# Patient Record
Sex: FEMALE | Employment: FULL TIME | URBAN - METROPOLITAN AREA
[De-identification: names, ages, dates, MRNs, and addresses within clinical notes are randomized per-mention and may not be internally consistent; named-entity substitution may affect disease eponyms.]

---

## 2019-02-11 ENCOUNTER — HOSPITAL ENCOUNTER (EMERGENCY)
Age: 24
Discharge: HOME | End: 2019-02-11
Attending: EMERGENCY MEDICINE
Payer: COMMERCIAL

## 2019-02-11 VITALS
OXYGEN SATURATION: 100 % | HEART RATE: 79 BPM | DIASTOLIC BLOOD PRESSURE: 94 MMHG | WEIGHT: 122.36 LBS | SYSTOLIC BLOOD PRESSURE: 135 MMHG | RESPIRATION RATE: 18 BRPM | TEMPERATURE: 98.5 F

## 2019-02-11 DIAGNOSIS — N30.00 ACUTE CYSTITIS WITHOUT HEMATURIA: Primary | ICD-10-CM

## 2019-02-11 LAB
BACTERIA: ABNORMAL /HPF
BILIRUBIN URINE: NEGATIVE
BLOOD, URINE: ABNORMAL
CLARITY: CLEAR
COLOR: YELLOW
EPITHELIAL CELLS, UA: ABNORMAL /HPF
GLUCOSE URINE: NEGATIVE MG/DL
HCG(URINE) PREGNANCY TEST: NEGATIVE
KETONES, URINE: NEGATIVE MG/DL
LEUKOCYTE ESTERASE, URINE: ABNORMAL
MICROSCOPIC EXAMINATION: YES
NITRITE, URINE: POSITIVE
PH UA: 6
PROTEIN UA: 100 MG/DL
RBC UA: ABNORMAL /HPF (ref 0–2)
SPECIFIC GRAVITY UA: >=1.03
URINE REFLEX TO CULTURE: YES
URINE TYPE: ABNORMAL
UROBILINOGEN, URINE: 0.2 E.U./DL
WBC UA: ABNORMAL /HPF (ref 0–5)

## 2019-02-11 PROCEDURE — 99283 EMERGENCY DEPT VISIT LOW MDM: CPT

## 2019-02-11 PROCEDURE — 87077 CULTURE AEROBIC IDENTIFY: CPT

## 2019-02-11 PROCEDURE — 6370000000 HC RX 637 (ALT 250 FOR IP): Performed by: EMERGENCY MEDICINE

## 2019-02-11 PROCEDURE — 81001 URINALYSIS AUTO W/SCOPE: CPT

## 2019-02-11 PROCEDURE — 84703 CHORIONIC GONADOTROPIN ASSAY: CPT

## 2019-02-11 PROCEDURE — 87086 URINE CULTURE/COLONY COUNT: CPT

## 2019-02-11 PROCEDURE — 87186 SC STD MICRODIL/AGAR DIL: CPT

## 2019-02-11 RX ORDER — PHENAZOPYRIDINE HYDROCHLORIDE 100 MG/1
100 TABLET, FILM COATED ORAL 3 TIMES DAILY PRN
Qty: 9 TABLET | Refills: 0 | Status: SHIPPED | OUTPATIENT
Start: 2019-02-11 | End: 2019-02-14

## 2019-02-11 RX ORDER — IBUPROFEN 200 MG
200 TABLET ORAL EVERY 6 HOURS PRN
COMMUNITY

## 2019-02-11 RX ORDER — NITROFURANTOIN 25; 75 MG/1; MG/1
100 CAPSULE ORAL ONCE
Status: COMPLETED | OUTPATIENT
Start: 2019-02-11 | End: 2019-02-11

## 2019-02-11 RX ORDER — NITROFURANTOIN 25; 75 MG/1; MG/1
100 CAPSULE ORAL 2 TIMES DAILY
Qty: 14 CAPSULE | Refills: 0 | Status: SHIPPED | OUTPATIENT
Start: 2019-02-11 | End: 2019-02-18

## 2019-02-11 RX ADMIN — NITROFURANTOIN MONOHYDRATE/MACROCRYSTALLINE 100 MG: 25; 75 CAPSULE ORAL at 05:09

## 2019-02-11 ASSESSMENT — PAIN SCALES - GENERAL
PAINLEVEL_OUTOF10: 7
PAINLEVEL_OUTOF10: 7

## 2019-02-11 ASSESSMENT — PAIN DESCRIPTION - LOCATION
LOCATION: BACK
LOCATION: BACK

## 2019-02-11 ASSESSMENT — PAIN DESCRIPTION - ONSET: ONSET: ON-GOING

## 2019-02-11 ASSESSMENT — PAIN DESCRIPTION - FREQUENCY: FREQUENCY: INTERMITTENT

## 2019-02-13 LAB
ORGANISM: ABNORMAL
URINE CULTURE, ROUTINE: ABNORMAL
URINE CULTURE, ROUTINE: ABNORMAL

## 2023-04-03 ENCOUNTER — HOSPITAL ENCOUNTER (EMERGENCY)
Age: 28
Discharge: HOME | End: 2023-04-03
Attending: EMERGENCY MEDICINE
Payer: COMMERCIAL

## 2023-04-03 VITALS
BODY MASS INDEX: 19.6 KG/M2 | HEIGHT: 70 IN | DIASTOLIC BLOOD PRESSURE: 91 MMHG | SYSTOLIC BLOOD PRESSURE: 130 MMHG | OXYGEN SATURATION: 100 % | TEMPERATURE: 98 F | RESPIRATION RATE: 16 BRPM | WEIGHT: 136.91 LBS | HEART RATE: 88 BPM

## 2023-04-03 DIAGNOSIS — S99.922A FOOT INJURY, LEFT, INITIAL ENCOUNTER: Primary | ICD-10-CM

## 2023-04-03 DIAGNOSIS — S90.852A FOREIGN BODY IN LEFT FOOT, INITIAL ENCOUNTER: ICD-10-CM

## 2023-04-03 PROCEDURE — 99283 EMERGENCY DEPT VISIT LOW MDM: CPT

## 2023-04-03 PROCEDURE — 10120 INC&RMVL FB SUBQ TISS SMPL: CPT

## 2023-04-03 RX ORDER — CIPROFLOXACIN 750 MG/1
750 TABLET, FILM COATED ORAL 2 TIMES DAILY
Qty: 20 TABLET | Refills: 0 | Status: SHIPPED | OUTPATIENT
Start: 2023-04-03 | End: 2023-04-13

## 2023-04-03 ASSESSMENT — PAIN - FUNCTIONAL ASSESSMENT: PAIN_FUNCTIONAL_ASSESSMENT: 0-10

## 2023-04-03 ASSESSMENT — PAIN DESCRIPTION - ORIENTATION: ORIENTATION: LEFT

## 2023-04-03 ASSESSMENT — PAIN DESCRIPTION - LOCATION: LOCATION: FOOT

## 2023-04-03 ASSESSMENT — PAIN DESCRIPTION - PAIN TYPE: TYPE: ACUTE PAIN

## 2023-04-03 ASSESSMENT — PAIN DESCRIPTION - FREQUENCY: FREQUENCY: CONTINUOUS

## 2023-04-03 ASSESSMENT — PAIN SCALES - GENERAL: PAINLEVEL_OUTOF10: 7

## 2023-04-03 NOTE — ED PROVIDER NOTES
In addition to the advanced practice provider, I personally saw Gabe Krishnan and performed a substantive portion of the visit including all aspects of the medical decision making. Briefly, this is a 24 y.o. female here for foot injury which occurred 2 days ago. Patient states she was on vacation in [place] when she jumped out of a boat into the ocean, she had immediate pain in her left foot. She is not certain what she stepped on however believes it was a sea urchin. She has had continued pain and black spots in her foot since that time. Tetanus UTD. She denies any other injury. On exam, patient afebrile and nontoxic. No distress. Heart RRR. Lungs CTAB. Sole of left foot with multiple punctate lesions and suspected retained foreign bodies. There is no surrounding cellulitis, no abscess. Screenings   [place] Coma Scale  Eye Opening: Spontaneous  Best Verbal Response: Oriented  Best Motor Response: Obeys commands  [place] Coma Scale Score: 15      Is this patient to be included in the SEP-1 Core Measure due to severe sepsis or septic shock? No   Exclusion criteria - the patient is NOT to be included for SEP-1 Core Measure due to: Infection is not suspected      MDM    Patient afebrile and nontoxic. No distress. Overall she is well-appearing without systemic evidence of illness. No findings to suggest allergic reaction. Unclear etiology of foreign bodies in left foot, sea urchin sting certainly remains in the differential.  There is no evidence of abscess or cellulitis. Sole left foot was anesthetized and numerous small fragmented foreign bodies were removed. Please see CHRISTELLE note for details on procedure. Will treat prophylactically with antibiotics. Continued anti-inflammatories for pain control. Patient felt safe for discharge to self-care with close PCP follow-up. She is agreeable with plan and feels comfortable returning to home.   Return precautions including warning signs of
COURSE & MEDICAL DECISION MAKING    See chart for details of any medications ordered        History from : Patient    Limitations to history : None    Chronic Conditions: n/a    CONSULTS: (Who and What was discussed)  None    Discussion with Other Profesionals : None    Social Determinants : None    Records Reviewed : None    CC/HPI Summary, DDx, ED Course, and Reassessment: See HPI and above for full presentation and physical exam.    Patient is a very pleasant 24-year-old female who presents to the ED today with complaints of see urgent sting that she sustained 2 days ago. Was in [place], walking along the beach in the ocean. She felt something sting her left foot. No fevers or chills. No numbness or tingling distal to site. Is not diabetic or immunocompromise. Flew back yesterday, came to the ED today for further evaluation and treatment. On arrival she is afebrile and hemodynamically stable. Nontoxic in appearance. Pedal pulses 2+ with capillary refill less than 3 seconds. She was several black pinpoint areas within the ball of the foot. They have the appearance consistent with sea urchin sting. There is palpable foreign bodies within these wounds consistent with the spine still embedded within the epidermis of the foot. Will need removal.  See above procedure note. Differential diagnosis: neurologic injury, vascular injury, involvement of bone that could lead to osteomyelitis, delayed bacterial skin infection, other    Patient is afebrile and nontoxic in appearance. The friable spines were removed, all very pinpoint in nature. Had a black consistency. Consistent with sea urchin spines. Several were removed as she will be prophylaxed with Cipro given that this did occur in the ocean and we will give broad-spectrum coverage.   We will give her podiatry referral for follow-up as needed but can follow-up otherwise with her PCP, she needs to follow-up in 2 to 3 days to ensure adequate healing

## 2023-04-03 NOTE — ED TRIAGE NOTES
Pt arrives ambulatory for eval of foreign body in sole of foot, sts has been in [place] and stepped on something, maybe sea urchin. Pt is a/xo4, rsp nonlabored and pwd.

## 2023-04-03 NOTE — Clinical Note
Tom Shepard was seen and treated in our emergency department on 4/3/2023. She may return to work on 04/05/2023. If you have any questions or concerns, please don't hesitate to call.       Km Acevedo, DO